# Patient Record
Sex: MALE | Race: WHITE | NOT HISPANIC OR LATINO | Employment: UNEMPLOYED | URBAN - METROPOLITAN AREA
[De-identification: names, ages, dates, MRNs, and addresses within clinical notes are randomized per-mention and may not be internally consistent; named-entity substitution may affect disease eponyms.]

---

## 2023-03-19 ENCOUNTER — HOSPITAL ENCOUNTER (EMERGENCY)
Facility: MEDICAL CENTER | Age: 58
End: 2023-03-19
Attending: EMERGENCY MEDICINE

## 2023-03-19 VITALS
BODY MASS INDEX: 20.7 KG/M2 | WEIGHT: 170 LBS | DIASTOLIC BLOOD PRESSURE: 97 MMHG | RESPIRATION RATE: 14 BRPM | TEMPERATURE: 98.5 F | SYSTOLIC BLOOD PRESSURE: 155 MMHG | OXYGEN SATURATION: 98 % | HEART RATE: 61 BPM | HEIGHT: 76 IN

## 2023-03-19 DIAGNOSIS — L03.119 CELLULITIS OF FOOT: ICD-10-CM

## 2023-03-19 DIAGNOSIS — F10.930 ALCOHOL WITHDRAWAL SYNDROME WITHOUT COMPLICATION (HCC): ICD-10-CM

## 2023-03-19 DIAGNOSIS — I10 HYPERTENSION, UNSPECIFIED TYPE: ICD-10-CM

## 2023-03-19 PROCEDURE — 99284 EMERGENCY DEPT VISIT MOD MDM: CPT

## 2023-03-19 PROCEDURE — A9270 NON-COVERED ITEM OR SERVICE: HCPCS | Performed by: EMERGENCY MEDICINE

## 2023-03-19 PROCEDURE — 700102 HCHG RX REV CODE 250 W/ 637 OVERRIDE(OP): Performed by: EMERGENCY MEDICINE

## 2023-03-19 RX ORDER — CEPHALEXIN 500 MG/1
1000 CAPSULE ORAL ONCE
Status: COMPLETED | OUTPATIENT
Start: 2023-03-19 | End: 2023-03-19

## 2023-03-19 RX ORDER — CHLORDIAZEPOXIDE HYDROCHLORIDE 25 MG/1
50 CAPSULE, GELATIN COATED ORAL ONCE
Status: COMPLETED | OUTPATIENT
Start: 2023-03-19 | End: 2023-03-19

## 2023-03-19 RX ORDER — CEPHALEXIN 500 MG/1
1000 CAPSULE ORAL 3 TIMES DAILY
Qty: 42 CAPSULE | Refills: 0 | Status: ACTIVE | OUTPATIENT
Start: 2023-03-19 | End: 2023-03-26

## 2023-03-19 RX ADMIN — CHLORDIAZEPOXIDE HYDROCHLORIDE 50 MG: 25 CAPSULE ORAL at 15:59

## 2023-03-19 RX ADMIN — CEPHALEXIN 1000 MG: 500 CAPSULE ORAL at 15:59

## 2023-03-19 ASSESSMENT — FIBROSIS 4 INDEX: FIB4 SCORE: 4.51

## 2023-03-19 NOTE — ED PROVIDER NOTES
ED Provider Note    CHIEF COMPLAINT  Chief Complaint   Patient presents with    ETOH Withdrawal     Patient reports to wanting to detox from ETOH, patient states his last drink was about 2 hours ago. He reports to drinking two bottles of whiskey per day.     Foot Pain     Reports to a chronic right foot infection that he has been avoiding for years due to drinking.      EXTERNAL RECORDS REVIEWED  Patient was just admitted to [facility] for alcohol withdrawal. He was discharged yesterda  Patient just admitted to [facility] for alcohol withdrawal. He was discharged yesterday. He has a chronic foot wound. He had an MRI during this admission that showed no evidence of osteomyelitis or soft tissue abscess. Patient was treated for cellulitis. Patient had mild alcohol withdrawals and only required one dose of Ativan. Patient left AMA. He was discharged with Keflex. Prior to that, he was not seen in the system since 2010.      HPI/ROS  LIMITATION TO HISTORY   Select: : None  OUTSIDE HISTORIAN(S):  None    Perry Saba is a 58 y.o. male who presents to the Emergency Department with for evaluation of chronic right foot wound. Patient reported that his family members were worried for his health because he was drinking heavily. So, they had him admitted at [place] a few days ago. Patient was given antibiotics during his admission for his chronic foot wound. This improved the cellulitis on the foot. Perry had mild alcohol withdrawals during the admission; he had not drank alcohol for a few days prior to his admission. Patient eventually left AMA two days ago because he wanted to drink alcohol. Patient was sent with a prescription for antibiotics when he left [place], however he did not  the medication. He last drank alcohol two hours ago. No alleviating factors were reported.      PAST MEDICAL HISTORY  Past Medical History:   Diagnosis Date    Alcohol abuse     Anxiety     ETOH abuse      "for 23 yrs        SURGICAL HISTORY  Past Surgical History:   Procedure Laterality Date    PIN INSERTION  10/27/08    Performed by SURESH PALACIOS at SURGERY [facility] ORS    IRRIGATION & DEBRIDEMENT GENERAL  10/20/08    Performed by NIRAJ SEGURA at SURGERY [facility] ORS    LACERATION REPAIR  10/20/08    Performed by NIRAJ SEGURA at SURGERY [facility]        FAMILY HISTORY  History reviewed. No pertinent family history.    SOCIAL HISTORY   reports that he has never smoked. He does not have any smokeless tobacco history on file. He reports current alcohol use. He reports that he does not use drugs.    CURRENT MEDICATIONS  None noted.     ALLERGIES  Nkda [no known drug allergy] and Nkda [no known drug allergy]    PHYSICAL EXAM  BP (!) 161/94   Pulse 64   Temp 36.9 °C (98.5 °F) (Temporal)   Resp 16   Ht 1.93 m (6' 4\")   Wt 77.1 kg (170 lb)   SpO2 97%      Constitutional: Nontoxic appearing. Alert in no apparent distress.  HENT: Normocephalic, Atraumatic. Bilateral external ears normal. Nose normal.  Moist mucous membranes.    Neck: Supple, full range of motion  Musculoskeletal: Atraumatic. No obvious deformities noted.  No lower extremity edema.  Skin: Warm, Dry.  No erythema, No rash. Chronic open wound to the right heel. No surrounding erythema, drainage, or tenderness.   Neurologic: Alert and oriented x3. Moving all extremities spontaneously without focal deficits. No tremors or tongue fasciculation.   Psychiatric: Affect normal, Mood normal, Appears appropriate and not intoxicated.      COURSE & MEDICAL DECISION MAKING    3:34 PM - Patient was evaluated at bedside. Discussed plan for medication administration and then subsequent discharge. Will treat patient with Keflex capsule 1,000 mg and Librium 50 mg. Patient verbalizes understanding and agreement to this plan of care.     ED Observation Status? No; Patient does not meet criteria for ED Observation.     INITIAL ASSESSMENT, COURSE " AND PLAN  Care Narrative: Patient presents due to concern for alcohol withdrawal and foot infection.  He was recently seen at [facility] and treated for cellulitis.  Recent MRI without evidence of osteomyelitis or abscess.  Likely needs continuation of oral antibiotics however no signs of worsening infection or sepsis.  He has no significant signs of alcohol withdrawal at this point and did not go through significant withdrawal when recently hospitalized.  Plan for dose of librium and peer recovery to provide resources.    5:33 PM - Upon reassessment, patient is resting comfortably with stable vital signs although he remains hypertensive.  No new complaints at this time.  Discussed results with patient and/or family as well as importance of primary care follow up.  Patient understands plan of care and strict return precautions for new or changing symptoms.       ADDITIONAL PROBLEM LIST  Problem #1: Acute cellulitis of the foot - recent admission and workup, discharge with continuation of cephalexin    Problem #2: Alcohol withdrawal - no signs of severe withdrawal, treated with a dose of librium, given outpatient follow up    Problem #3: Hypertension - needs outpatient follow up with PCP    DISPOSITION AND DISCUSSIONS  Escalation of care considered, and ultimately not performed:blood analysis and diagnostic imaging    Barriers to care at this time, including but not limited to: Patient does not have established PCP.     Decision tools and prescription drugs considered including, but not limited to: Pain Medications over the counter medications should be sufficient .     The patient will return for new or worsening symptoms and is stable at the time of discharge.    The patient is referred to a primary physician for blood pressure management, diabetic screening, and for all other preventative health concerns.    DISPOSITION:  Patient will be discharged home in stable condition.    FOLLOW UP:  [facility] [address] [facility] [address]  [phone]  Call   to establish primary care physician    [facility], Emergency Dept  [address] [address] [phone]    If symptoms worsen      OUTPATIENT MEDICATIONS:  Discharge Medication List as of 3/19/2023  5:35 PM        START taking these medications    Details   cephALEXin (KEFLEX) 500 MG Cap Take 2 Capsules by mouth 3 times a day for 7 days., Disp-42 Capsule, R-0, Normal             FINAL DIAGNOSIS  1. Cellulitis of foot    2. Alcohol withdrawal syndrome without complication (HCC)    3. Hypertension, unspecified type        The note accurately reflects work and decisions made by me.  Jacque Glass M.D.  3/19/2023  10:29 PM     Cristina GARCIA (Patriciaibe), am scribing for, and in the presence of, Jacque Glass M.D..    Electronically signed by: Cristina Donohue (Snow), 3/19/2023    Jacque GARCIA M.D. personally performed the services described in this documentation, as scribed by Cristina Donohue in my presence, and it is both accurate and complete.

## 2023-03-19 NOTE — ED TRIAGE NOTES
"Perry Saba  58 y.o.    Chief Complaint   Patient presents with    ETOH Withdrawal     Patient reports to wanting to detox from ETOH, patient states his last drink was about 2 hours ago. He reports to drinking two bottles of whiskey per day.     Foot Pain     Reports to a chronic right foot infection that he has been avoiding for years due to drinking.        BP (!) 147/99   Pulse 73   Temp 36.9 °C (98.5 °F) (Temporal)   Resp 16   Ht 1.93 m (6' 4\")   Wt 77.1 kg (170 lb)   SpO2 99%   BMI 20.69 kg/m²     Pt placed in lobby and educated to alert staff with any changes or concerns.   "

## 2023-03-20 NOTE — DISCHARGE INSTRUCTIONS
You were seen in the Emergency Department for foot wound and concern for alcohol withdrawal.    Please use 1,000mg of tylenol or 600mg of ibuprofen every 6 hours as needed for pain.  Take antibiotics as directed.    Please follow up with your primary care physician for recheck of the wound.    Return to the Emergency Department with increasing pain, swelling, redness of the wound, increasing withdrawal symptoms including seizures, other concerns.

## 2023-03-20 NOTE — ED NOTES
Patient discharged home per ERP.  Discharge teaching and education discussed with patient. POC discussed.   Patient verbalized understanding of discharge teaching and education. No other questions at this time.     RX x 1 sent to pharmacy by MD. RODRIGEZ. Patient alert and oriented. Patient arranged ride for self. Able to ambulate off unit safely with steady gait.